# Patient Record
Sex: FEMALE | Race: BLACK OR AFRICAN AMERICAN | Employment: OTHER | URBAN - METROPOLITAN AREA
[De-identification: names, ages, dates, MRNs, and addresses within clinical notes are randomized per-mention and may not be internally consistent; named-entity substitution may affect disease eponyms.]

---

## 2017-08-21 ENCOUNTER — OFFICE VISIT (OUTPATIENT)
Dept: HEMATOLOGY | Age: 67
End: 2017-08-21

## 2017-08-21 ENCOUNTER — HOSPITAL ENCOUNTER (OUTPATIENT)
Dept: LAB | Age: 67
Discharge: HOME | End: 2017-08-21
Payer: COMMERCIAL

## 2017-08-21 VITALS
WEIGHT: 119 LBS | TEMPERATURE: 98 F | DIASTOLIC BLOOD PRESSURE: 86 MMHG | HEART RATE: 67 BPM | OXYGEN SATURATION: 100 % | BODY MASS INDEX: 23.36 KG/M2 | RESPIRATION RATE: 16 BRPM | SYSTOLIC BLOOD PRESSURE: 134 MMHG | HEIGHT: 60 IN

## 2017-08-21 DIAGNOSIS — R74.8 ELEVATED LIVER ENZYMES: Primary | ICD-10-CM

## 2017-08-21 DIAGNOSIS — R74.8 ELEVATED LIVER ENZYMES: ICD-10-CM

## 2017-08-21 PROBLEM — M85.80 OSTEOPENIA: Status: ACTIVE | Noted: 2017-08-21

## 2017-08-21 PROBLEM — D64.9 ANEMIA: Status: ACTIVE | Noted: 2017-08-21

## 2017-08-21 PROBLEM — M81.0 AGE-RELATED OSTEOPOROSIS WITHOUT CURRENT PATHOLOGICAL FRACTURE: Status: ACTIVE | Noted: 2017-08-21

## 2017-08-21 LAB
ALBUMIN SERPL-MCNC: 4.6 G/DL (ref 3.4–5)
ALBUMIN/GLOB SERPL: 1.3 {RATIO} (ref 0.8–1.7)
ALP SERPL-CCNC: 59 U/L (ref 45–117)
ALT SERPL-CCNC: 29 U/L (ref 13–56)
ANION GAP SERPL CALC-SCNC: 9 MMOL/L (ref 3–18)
AST SERPL-CCNC: 27 U/L (ref 15–37)
BASOPHILS # BLD: 0 K/UL (ref 0–0.06)
BASOPHILS NFR BLD: 0 % (ref 0–2)
BILIRUB DIRECT SERPL-MCNC: 0.1 MG/DL (ref 0–0.2)
BILIRUB SERPL-MCNC: 0.4 MG/DL (ref 0.2–1)
BUN SERPL-MCNC: 18 MG/DL (ref 7–18)
BUN/CREAT SERPL: 23 (ref 12–20)
CALCIUM SERPL-MCNC: 9.6 MG/DL (ref 8.5–10.1)
CHLORIDE SERPL-SCNC: 103 MMOL/L (ref 100–108)
CO2 SERPL-SCNC: 28 MMOL/L (ref 21–32)
CREAT SERPL-MCNC: 0.79 MG/DL (ref 0.6–1.3)
DIFFERENTIAL METHOD BLD: ABNORMAL
EOSINOPHIL # BLD: 0.1 K/UL (ref 0–0.4)
EOSINOPHIL NFR BLD: 1 % (ref 0–5)
ERYTHROCYTE [DISTWIDTH] IN BLOOD BY AUTOMATED COUNT: 15 % (ref 11.6–14.5)
FERRITIN SERPL-MCNC: 685 NG/ML (ref 8–388)
GLOBULIN SER CALC-MCNC: 3.5 G/DL (ref 2–4)
GLUCOSE SERPL-MCNC: 87 MG/DL (ref 74–99)
HCT VFR BLD AUTO: 33 % (ref 35–45)
HGB BLD-MCNC: 10.6 G/DL (ref 12–16)
IRON SATN MFR SERPL: 21 %
IRON SERPL-MCNC: 87 UG/DL (ref 50–175)
LYMPHOCYTES # BLD: 2.2 K/UL (ref 0.9–3.6)
LYMPHOCYTES NFR BLD: 50 % (ref 21–52)
MCH RBC QN AUTO: 23.1 PG (ref 24–34)
MCHC RBC AUTO-ENTMCNC: 32.1 G/DL (ref 31–37)
MCV RBC AUTO: 72.1 FL (ref 74–97)
MONOCYTES # BLD: 0.4 K/UL (ref 0.05–1.2)
MONOCYTES NFR BLD: 10 % (ref 3–10)
NEUTS SEG # BLD: 1.7 K/UL (ref 1.8–8)
NEUTS SEG NFR BLD: 39 % (ref 40–73)
PLATELET # BLD AUTO: 385 K/UL (ref 135–420)
PMV BLD AUTO: 9.6 FL (ref 9.2–11.8)
POTASSIUM SERPL-SCNC: 4.3 MMOL/L (ref 3.5–5.5)
PROT SERPL-MCNC: 8.1 G/DL (ref 6.4–8.2)
RBC # BLD AUTO: 4.58 M/UL (ref 4.2–5.3)
SODIUM SERPL-SCNC: 140 MMOL/L (ref 136–145)
TIBC SERPL-MCNC: 409 UG/DL (ref 250–450)
WBC # BLD AUTO: 4.3 K/UL (ref 4.6–13.2)

## 2017-08-21 PROCEDURE — 86038 ANTINUCLEAR ANTIBODIES: CPT | Performed by: NURSE PRACTITIONER

## 2017-08-21 PROCEDURE — 86706 HEP B SURFACE ANTIBODY: CPT | Performed by: NURSE PRACTITIONER

## 2017-08-21 PROCEDURE — 86803 HEPATITIS C AB TEST: CPT | Performed by: NURSE PRACTITIONER

## 2017-08-21 PROCEDURE — 83540 ASSAY OF IRON: CPT | Performed by: NURSE PRACTITIONER

## 2017-08-21 PROCEDURE — 86708 HEPATITIS A ANTIBODY: CPT | Performed by: NURSE PRACTITIONER

## 2017-08-21 PROCEDURE — 87340 HEPATITIS B SURFACE AG IA: CPT | Performed by: NURSE PRACTITIONER

## 2017-08-21 PROCEDURE — 80048 BASIC METABOLIC PNL TOTAL CA: CPT | Performed by: NURSE PRACTITIONER

## 2017-08-21 PROCEDURE — 83516 IMMUNOASSAY NONANTIBODY: CPT | Performed by: NURSE PRACTITIONER

## 2017-08-21 PROCEDURE — 86704 HEP B CORE ANTIBODY TOTAL: CPT | Performed by: NURSE PRACTITIONER

## 2017-08-21 PROCEDURE — 82728 ASSAY OF FERRITIN: CPT | Performed by: NURSE PRACTITIONER

## 2017-08-21 PROCEDURE — 36415 COLL VENOUS BLD VENIPUNCTURE: CPT | Performed by: NURSE PRACTITIONER

## 2017-08-21 PROCEDURE — 80076 HEPATIC FUNCTION PANEL: CPT | Performed by: NURSE PRACTITIONER

## 2017-08-21 PROCEDURE — 85025 COMPLETE CBC W/AUTO DIFF WBC: CPT | Performed by: NURSE PRACTITIONER

## 2017-08-21 RX ORDER — FENOFIBRATE 160 MG/1
160 TABLET ORAL DAILY
COMMUNITY

## 2017-08-21 RX ORDER — GLUCOSAMINE SULFATE 1500 MG
POWDER IN PACKET (EA) ORAL DAILY
COMMUNITY

## 2017-08-21 NOTE — PROGRESS NOTES
[address] Kelle Blackwell MD, [address], [facility], [place]       Fredia Gram, NP Brooke Goodell, PA-C Franciso Barker, NP Ronnell Fey, NP        at [address], [address] [address]  [address].     [phone]     FAX: [phone]    at [address], [address] [place] [address], [address]     [phone]     FAX: [phone]         Patient Care Team:  Fernando Galeano MD as PCP - General (Family Practice)  Rosanna Morrissey MD (Gastroenterology)  Chick MD Tito (Internal Medicine)      Problem List  Never Reviewed          Codes Class Noted    Osteopenia ICD-10-CM: M85.80  ICD-9-CM: 733.90  8/21/2017        Elevated liver enzymes ICD-10-CM: R74.8  ICD-9-CM: 790.5  8/21/2017        Age-related osteoporosis without current pathological fracture ICD-10-CM: M81.0  ICD-9-CM: 733.01  8/21/2017        Anemia ICD-10-CM: D64.9  ICD-9-CM: 285.9  8/21/2017              The physicians listed above have asked me to see Paul Schwarz in consultation regarding elevated liver enzymes and elevated ferritin and its management. No medical records were available for review when the patient was here for the appointment. The patient is a 74 y.o. Black female who was first noted to have abnormalities in liver enzymes and ferritin in 5/2017. Serologic evaluation for markers of chronic liver disease were positive for elevation in ferritin,    Imaging of the liver was not performed. An assessment of liver fibrosis with biopsy or elastography has not been performed. The patient had not started any new medications within 3 months preceeding the elevation in liver chemistries.     The most recent laboratory studies indicate that the AST is elevated, ALT is normal, ALP is normal, tests of hepatic synthetic and metabolic function are normal, total bilirubin is normal, albumin is normal, and the platelet count is normal.    The patient has no symptoms which could be attributed to the liver disorder. The patient completes all daily activities without any functional limitations. The patient has not experienced fatigue, pain in the right side over the liver. ALLERGIES  Allergies   Allergen Reactions    Codeine Other (comments)    Statins-Hmg-Coa Reductase Inhibitors Other (comments)    Sulfa (Sulfonamide Antibiotics) Rash       MEDICATIONS  Current Outpatient Prescriptions   Medication Sig    fenofibrate (LOFIBRA) 160 mg tablet Take 160 mg by mouth daily.  cholecalciferol (VITAMIN D3) 1,000 unit cap Take  by mouth daily.  multivitamin, tx-iron-ca-min (THERA-M W/ IRON) 9 mg iron-400 mcg tab tablet Take 1 Tab by mouth daily.  denosumab (PROLIA) 60 mg/mL injection 60 mg by SubCUTAneous route. No current facility-administered medications for this visit. SYSTEM REVIEW NOT RELATED TO LIVER DISEASE OR REVIEWED ABOVE:  Constitution systems: Negative for fever, chills, weight gain, weight loss. Eyes: Negative for visual changes. ENT: Negative for sore throat, painful swallowing. Respiratory: Negative for cough, hemoptysis, SOB. Cardiology: Negative for chest pain, palpitations. GI:  Negative for constipation or diarrhea. : Negative for urinary frequency, dysuria, hematuria, nocturia. Skin: Negative for rash. Hematology: Negative for easy bruising, blood clots. Musculo-skelatal: Negative for back pain, muscle pain, weakness. Neurologic: Negative for headaches, dizziness, vertigo, memory problems not related to HE. Psychology: Negative for anxiety, depression. FAMILY HISTORY:  The father  of unknown causes. The mother is alive and has the following chronic diseases: CAD, COPD, DM. There is no family history of liver disease. SOCIAL HISTORY:  The patient is . The patient has 2 children, 1 grandchild.     The patient has never used tobacco products. The patient consumes alcohol on social occasions never in excess. The patient used to work in a medical office. The patient retired in 2011. PHYSICAL EXAMINATION:  Visit Vitals    /86 (BP 1 Location: Right arm, BP Patient Position: Sitting)    Pulse 67    Temp 98 °F (36.7 °C) (Tympanic)    Resp 16    Ht 5' (1.524 m)    Wt 119 lb (54 kg)    SpO2 100%    BMI 23.24 kg/m2     General: No acute distress. Eyes: Sclera anicteric. ENT: No oral lesions. Thyroid normal.  Nodes: No adenopathy. Skin: No spider angiomata. No jaundice. No palmar erythema. Respiratory: Lungs clear to auscultation. Cardiovascular: Regular heart rate. No murmurs. No JVD. Abdomen: Soft non-tender. Liver size normal to percussion/palpation. Spleen not palpable. No obvious ascites. Extremities: No edema. No muscle wasting. No gross arthritic changes. Neurologic: Alert and oriented. Cranial nerves grossly intact. No asterixis. LABORATORY STUDIES:  From 5/2017  AST/ALT/ALP/T Bili/ALB: 46/22/52/0.2/4.6  WBC/HB/PLT/INR: 4.9/10.2/329/  BUN/CREAT: 16/0.79    SEROLOGIES:  Not available or performed. Testing was performed today. LIVER HISTOLOGY:  Not available or performed    ENDOSCOPIC PROCEDURES:  Not available or performed    RADIOLOGY:  Not available or performed    OTHER TESTING:  Not available or performed    ASSESSMENT AND PLAN:  Elevation in AST and Ferritin of unclear etiology at this time. Liver function is normal.  Total bilirubin is normal. Serum albumin is normal.  The platelet count is normal.      Serologic testing to help define the cause of the laboratory abnormality were ordered. Will perform additional serologic tests to screen for other causes of chronic liver disease. Will perform laboratory testing to monitor liver function and degree of liver injury. This included BMP, hepatic panel, CBC with platelet count.     Will perform imaging of the liver with ultrasound. The need to assess liver fibrosis was discussed. Sheer wave elastography can assess liver fibrosis and determine if a patient has advanced fibrosis or cirrhosis without the need for liver biopsy. Sheer wave elastography is now available at [facility]. This will be scheduled. If elastrography suggests advanced fibrosis then a liver biopsy should be considered. The patient was directed to continue all current medications at the current dosages. There are no contraindications for the patient to take any medications that are necessary for treatment of other medical issues. The patient was counseled regarding alcohol consumption. The need for vaccination against viral hepatitis A and B will be assessed with serologic and instituted as appropriate. All of the above issues were discussed with the patient. All questions were answered. The patient expressed a clear understanding of the above. [address] [place] [address] in 4 weeks to review all data and determine the treatment plan.     Lane Luna MD  Liver [place] of [address] [place] [address], [address] [place] [address] - [address]  [phone]

## 2017-08-21 NOTE — PROGRESS NOTES
[address] Kelle Blackwell MD, [place], [facility], [place]       Huber Jones, NARINDER Mcelroy, CELENA Vital, NARINDER Zamudio NP        at [address], [address] [address]  [address].     [phone]     FAX: [phone]    at [address], [address] [place] [address], [address]     [phone]     FAX: [phone]         Patient Care Team:  David Grant MD as PCP - General (Family Practice)  Wilson Atwood MD (Gastroenterology)  Javier Maharaj MD (Internal Medicine)      Problem List  Never Reviewed          Codes Class Noted    Osteopenia ICD-10-CM: M85.80  ICD-9-CM: 733.90  8/21/2017        Elevated liver enzymes ICD-10-CM: R74.8  ICD-9-CM: 790.5  8/21/2017                The physicians listed above have asked me to see Nilda Rahman in consultation regarding elevated liver enzymes and elevated ferritin level and its management. No medical records were available for review when the patient was here for the appointment. The patient is a 74 y.o. Black female who was first noted to have abnormalities in liver enzymes in 5/2017     Serologic evaluation for markers of chronic liver disease were either not performed or available to me. iron studies,  elevation in ferritin,  elevation in iron saturation,   a low alpha-1-antitrypsin level  a low ceruloplasmin. Imaging of the liver was either not performed or the results are not available to me. The most recent laboratory studies indicate that the AST is slightly elevated. ALT is normal. ALP is normal, tests of hepatic synthetic and metabolic function are normal,   depressed,   total bilirubin is normal, albumin is normal, platelet count is normal.    The patient has no symptoms which could be attributed to the liver disorder.       The patient completes all daily activities without any functional limitations. The patient has not experienced pain in the right side over the liver, nausea, vomiting,       ALLERGIES  Allergies   Allergen Reactions    Codeine Other (comments)    Statins-Hmg-Coa Reductase Inhibitors Other (comments)    Sulfa (Sulfonamide Antibiotics) Rash       MEDICATIONS  Current Outpatient Prescriptions   Medication Sig    fenofibrate (LOFIBRA) 160 mg tablet Take 160 mg by mouth daily.  cholecalciferol (VITAMIN D3) 1,000 unit cap Take  by mouth daily.  multivitamin, tx-iron-ca-min (THERA-M W/ IRON) 9 mg iron-400 mcg tab tablet Take 1 Tab by mouth daily. No current facility-administered medications for this visit. SYSTEM REVIEW NOT RELATED TO LIVER DISEASE OR REVIEWED ABOVE:  Constitution systems: Negative for fever, chills, weight gain, weight loss. Eyes: Negative for visual changes. ENT: Negative for sore throat, painful swallowing. Respiratory: Negative for cough, hemoptysis, SOB. Cardiology: Negative for chest pain, palpitations. GI:  Negative for constipation or diarrhea. : Negative for urinary frequency, dysuria, hematuria, nocturia. Skin: Negative for rash. Hematology: Negative for easy bruising, blood clots. Musculo-skelatal: Negative for back pain, muscle pain, weakness. Neurologic: Negative for headaches, dizziness, vertigo, memory problems not related to HE. Psychology: Negative for anxiety, depression. FAMILY HISTORY:  The father  of unknown cause      The mother is alive and has the following chronic diseases: CHF, COPD, DM, Obesity      There is no family history of liver disease. SOCIAL HISTORY:  The patient is . The patient has 2 children, 1 grandchild. The patient has never used tobacco products. The patient has never consumed significant amounts of alcohol. Consumes 2 glasses of wine/week. The patient retired in  from a medical office.      PHYSICAL EXAMINATION:  Visit Vitals    BP 134/86 (BP 1 Location: Right arm, BP Patient Position: Sitting)    Pulse 67    Temp 98 °F (36.7 °C) (Tympanic)    Resp 16    Ht 5' (1.524 m)    Wt 119 lb (54 kg)    SpO2 100%    BMI 23.24 kg/m2     General: No acute distress. Eyes: Sclera anicteric. ENT: No oral lesions. Thyroid normal.  Nodes: No adenopathy. Skin: No spider angiomata. No jaundice. No palmar erythema. Respiratory: Lungs clear to auscultation. Cardiovascular: Regular heart rate. No murmurs. No JVD. Abdomen: Soft non-tender. Liver size normal to percussion/palpation. Spleen not palpable. No obvious ascites. Extremities: No edema. No muscle wasting. No gross arthritic changes. Neurologic: Alert and oriented. Cranial nerves grossly intact. No asterixis. LABORATORY STUDIES:  From   AST/ALT/ALP/T Bili/ALB:   WBC/HB/PLT/INR:  BUN/CREAT:    Recent liver function panel, CBC with platelet count and BMP are not available. These studies will be performed. SEROLOGIES:  Not available or performed. Testing was performed today. LIVER HISTOLOGY:  Not available or performed    ENDOSCOPIC PROCEDURES:  Not available or performed    RADIOLOGY:  Not available or performed    OTHER TESTING:  Not available or performed    ASSESSMENT AND PLAN:  Persistent elevation in  Intermitant elevation in  liver transaminases   and   alkaline phosphate   of unclear etiology at this time. Liver function is   normal.    depressed. Total bilirubin is   normal.    elevated. Serum albumin is   normal.    depressed. INR is   normal.    prolonged. The platelet count is   normal.    depressed. Based upon laboratory studies   and imaging   the patient   does not appear to have   appears to have   may have   advanced liver disease or  cirrhosis. Serologic testing to help define the cause of the laboratory abnormality   were ordered. were all negative.     were positive for   HCV   HBV   DORY   ASMA   AMA Alpha-1-antitrypsin  Khurram disease   an elevation in iron studies   Will perform additional serologic tests to screen for other causes of chronic liver disease. The most likely causes for the liver chemistry abnormalities were discussed with the patient and include   viral hepatitis,   fatty liver disease,   immune liver disorders,   genetic metabolic liver disorders,   medications,   a non-specific non-hepatitis virus. Will perform laboratory testing to monitor liver function and degree of liver injury. This included   BMP,   hepatic panel,   CBC with platelet count,   INR. Will perform imaging of the liver with   ultrasound. triple phase CT scan. dynamic MRI. The need to perform a liver biopsy to help determine the cause and severity of the liver test abnormalities was discussed. The risks of performing the liver biopsy including pain, puncture of the lung, gallbladder, intestine or kidney and bleeding were discussed. The patient has decided to have a liver biopsy. This will be scheduled. Will defer liver biopsy for now. There is no reason to perform liver biopsy at this time. Liver chemistries will be monitored. The patient had a liver biopsy performed in ***. We will request that the liver biopsy slides sent be to me for my personal review. The patient was directed to continue all current medications at the current dosages. There are no contraindications for the patient to take any medications that are necessary for treatment of other medical issues. The patient was instructed to stop taking ***. The patient was counseled regarding alcohol consumption. Vaccination for viral hepatitis A and B is recommended since the patient has no serologic evidence of previous exposure or vaccination with immunity. Vaccination for viral hepatitis A and B is not recommended. The patient has serologic evidence of prior exposure or vaccination with immunity.   Vaccination for viral hepatitis A and B has been initiated. Vaccination for viral hepatitis A and B has been completed. Serologic studies will be performed to assess response to vaccine. The need for vaccination against viral hepatitis A and B will be assessed with serologic and instituted as appropriate. [address] screening   has recently been performed and does not suggest [address] The next liver imaging study will be performed in ***/***.  will be performed. AFP was ordered today. Ultrasound will be scheduled prior to or the same day as the next office visit. demonstrates   an elevation in AFP. a lesion on ultrasound. Will perform   triple phase CT scan   MRI   of liver to further characterize the lesion and help determine if this represents [address] All of the above issues were discussed with the patient. All questions were answered. The patient expressed a clear understanding of the above. Follow-up [address] in ***  weeks. months  2 weeks after liver biopsy. to review all data and determine the treatment plan.

## 2017-08-21 NOTE — MR AVS SNAPSHOT
Visit Information Date & Time Provider Department Dept. Phone Encounter #  
 8/21/2017 11:30 AM [facility]  [address] [id number] Follow-up Instructions Return in about 4 weeks (around 9/18/2017) for Erasmovalentino. Upcoming Health Maintenance Date Due Hepatitis C Screening 1950 DTaP/Tdap/Td series (1 - Tdap) 2/16/1973 BREAST CANCER SCRN MAMMOGRAM 2/16/2002 FOBT Q 1 YEAR AGE 50-75 2/16/2002 ZOSTER VACCINE AGE 60> 12/16/2011 GLAUCOMA SCREENING Q2Y 2/16/2017 OSTEOPOROSIS SCREENING (DEXA) 2/16/2017 Pneumococcal 65+ Low/Medium Risk (1 of 2 - PCV13) 2/16/2017 MEDICARE YEARLY EXAM 2/16/2017 INFLUENZA AGE 9 TO ADULT 8/1/2017 Allergies as of 8/21/2017  Review Complete On: 8/21/2017 By: Bong Fuentes Severity Noted Reaction Type Reactions Codeine  08/21/2017    Other (comments) Statins-hmg-coa Reductase Inhibitors  08/21/2017    Other (comments) Sulfa (Sulfonamide Antibiotics)  08/21/2017    Rash Current Immunizations  Never Reviewed No immunizations on file. Not reviewed this visit You Were Diagnosed With   
  
 Codes Comments Elevated liver enzymes    -  Primary ICD-10-CM: R74.8 ICD-9-CM: 790.5 Vitals BP Pulse Temp Resp Height(growth percentile) Weight(growth percentile) 134/86 (BP 1 Location: Right arm, BP Patient Position: Sitting) 67 98 °F (36.7 °C) (Tympanic) 16 5' (1.524 m) 119 lb (54 kg) SpO2 BMI OB Status Smoking Status 100% 23.24 kg/m2 Postmenopausal Never Smoker Vitals History BMI and BSA Data Body Mass Index Body Surface Area  
 23.24 kg/m 2 1.51 m 2 Your Updated Medication List  
  
   
This list is accurate as of: 8/21/17  1:23 PM.  Always use your most recent med list.  
  
  
  
  
 fenofibrate 160 mg tablet Commonly known as:  LOFIBRA Take 160 mg by mouth daily. multivitamin, tx-iron-ca-min 9 mg iron-400 mcg Tab tablet Commonly known as:  THERA-M w/ IRON Take 1 Tab by mouth daily. VITAMIN D3 1,000 unit Cap Generic drug:  cholecalciferol Take  by mouth daily. Follow-up Instructions Return in about 4 weeks (around 9/18/2017) for Eleazar. To-Do List   
 08/21/2017 Lab:  ACTIN (SMOOTH MUSCLE) ANTIBODY   
  
 08/21/2017 Lab:  ANTINUCLEAR ANTIBODIES, IFA   
  
 08/21/2017 Lab:  CBC WITH AUTOMATED DIFF   
  
 08/21/2017 Lab:  FERRITIN   
  
 08/21/2017 Lab:  HCV AB W/REFLEX VERIFICATION   
  
 08/21/2017 Lab:  HEP A AB, TOTAL   
  
 08/21/2017 Lab:  HEP B SURFACE AB   
  
 08/21/2017 Lab:  HEP B SURFACE AG   
  
 08/21/2017 Lab:  HEPATIC FUNCTION PANEL   
  
 08/21/2017 Lab:  HEPATITIS B CORE AB, TOTAL   
  
 08/21/2017 Lab:  IRON PROFILE   
  
 08/21/2017 Lab:  METABOLIC PANEL, BASIC   
  
 08/21/2017 Imaging:  US ABD LTD W ELASTOGRAPHY Introducing hospitals & HEALTH SERVICES! [address] [place] [address] introduces [employer] patient portal. Now you can access parts of your medical record, email your doctor's office, and request medication refills online. 1. In your internet browser, go to [email or web address] 2. Click on the First Time User? Click Here link in the Sign In box. You will see the New Member Sign Up page. 3. Enter your [employer] Access Code exactly as it appears below. You will not need to use this code after youve completed the sign-up process. If you do not sign up before the expiration date, you must request a new code. · [employer] Access Code: [email or web address] Expires: 11/19/2017  1:23 PM 
 
4. Enter the last four digits of your Social Security Number (xxxx) and Date of Birth (mm/dd/yyyy) as indicated and click Submit. You will be taken to the next sign-up page. 5. Create a [employer] ID.  This will be your [employer] login ID and cannot be changed, so think of one that is secure and easy to remember. 6. Create a [employer] password. You can change your password at any time. 7. Enter your Password Reset Question and Answer. This can be used at a later time if you forget your password. 8. Enter your e-mail address. You will receive e-mail notification when new information is available in [address] 9. Click Sign Up. You can now view and download portions of your medical record. 10. Click the Download Summary menu link to download a portable copy of your medical information. If you have questions, please visit the Frequently Asked Questions section of the [employer] website. Remember, [employer] is NOT to be used for urgent needs. For medical emergencies, dial 911. Now available from your iPhone and Android! Please provide this summary of care documentation to your next provider. Your primary care clinician is listed as Issac Swartz. If you have any questions after today's visit, please call [phone].

## 2017-08-22 LAB
ACTIN IGG SERPL-ACNC: 11 UNITS (ref 0–19)
ANA TITR SER IF: POSITIVE {TITER}
COMMENT, 144067: NORMAL
HAV AB SER QL IA: NEGATIVE
HBV CORE AB SERPL QL IA: NEGATIVE
HBV SURFACE AB SER QL IA: NEGATIVE
HBV SURFACE AB SERPL IA-ACNC: <3.1 MIU/ML
HBV SURFACE AG SER QL: <0.1 INDEX
HBV SURFACE AG SER QL: NEGATIVE
HCV AB S/CO SERPL IA: 0.1 S/CO RATIO (ref 0–0.9)
HEP BS AB COMMENT,HBSAC: ABNORMAL
HOMOGENEOUS PATTERN, 016279: NORMAL
NOTE:, 016270: NORMAL

## 2017-09-05 ENCOUNTER — HOSPITAL ENCOUNTER (OUTPATIENT)
Dept: ULTRASOUND IMAGING | Age: 67
Discharge: HOME | End: 2017-09-05
Attending: NURSE PRACTITIONER
Payer: MEDICARE

## 2017-09-05 DIAGNOSIS — R74.8 ELEVATED LIVER ENZYMES: ICD-10-CM

## 2017-09-05 PROCEDURE — 0346T US ABD LTD W ELASTOGRAPHY: CPT

## 2017-09-18 ENCOUNTER — HOSPITAL ENCOUNTER (OUTPATIENT)
Dept: LAB | Age: 67
Discharge: HOME | End: 2017-09-18
Payer: MEDICARE

## 2017-09-18 ENCOUNTER — OFFICE VISIT (OUTPATIENT)
Dept: HEMATOLOGY | Age: 67
End: 2017-09-18

## 2017-09-18 VITALS
HEIGHT: 60 IN | HEART RATE: 74 BPM | BODY MASS INDEX: 23.44 KG/M2 | WEIGHT: 119.4 LBS | SYSTOLIC BLOOD PRESSURE: 128 MMHG | OXYGEN SATURATION: 99 % | TEMPERATURE: 96.8 F | RESPIRATION RATE: 16 BRPM | DIASTOLIC BLOOD PRESSURE: 88 MMHG

## 2017-09-18 DIAGNOSIS — R74.8 ELEVATED LIVER ENZYMES: Primary | ICD-10-CM

## 2017-09-18 DIAGNOSIS — R74.8 ELEVATED LIVER ENZYMES: ICD-10-CM

## 2017-09-18 LAB
ALBUMIN SERPL-MCNC: 4.3 G/DL (ref 3.4–5)
ALBUMIN/GLOB SERPL: 1.3 {RATIO} (ref 0.8–1.7)
ALP SERPL-CCNC: 60 U/L (ref 45–117)
ALT SERPL-CCNC: 28 U/L (ref 13–56)
AST SERPL-CCNC: 30 U/L (ref 15–37)
BILIRUB DIRECT SERPL-MCNC: 0.1 MG/DL (ref 0–0.2)
BILIRUB SERPL-MCNC: 0.2 MG/DL (ref 0.2–1)
GLOBULIN SER CALC-MCNC: 3.2 G/DL (ref 2–4)
PROT SERPL-MCNC: 7.5 G/DL (ref 6.4–8.2)

## 2017-09-18 PROCEDURE — 36415 COLL VENOUS BLD VENIPUNCTURE: CPT | Performed by: NURSE PRACTITIONER

## 2017-09-18 PROCEDURE — 80076 HEPATIC FUNCTION PANEL: CPT | Performed by: NURSE PRACTITIONER

## 2017-09-18 PROCEDURE — 81256 HFE GENE: CPT | Performed by: NURSE PRACTITIONER

## 2017-09-18 NOTE — PROGRESS NOTES
March Leigha is a 74 y.o. female    No chief complaint on file. 1. Have you been to the ER, urgent care clinic or hospitalized since your last visit? NO.     2. Have you seen or consulted any other health care providers outside of the [address] [place] [address] since your last visit (Include any pap smears or colon screening)?  NO  Learning Assessment 8/21/2017   PRIMARY LEARNER Patient   PRIMARY LANGUAGE ENGLISH   LEARNER PREFERENCE PRIMARY DEMONSTRATION   ANSWERED BY self   RELATIONSHIP SELF

## 2017-09-18 NOTE — MR AVS SNAPSHOT
Visit Information Date & Time Provider Department Dept. Phone Encounter #  
 9/18/2017  8:05 AM MD Ellie Melchor [address] of  [address] [id number] Follow-up Instructions Return in about 6 months (around 3/18/2018) for chandan. Upcoming Health Maintenance Date Due DTaP/Tdap/Td series (1 - Tdap) 2/16/1973 BREAST CANCER SCRN MAMMOGRAM 2/16/2002 FOBT Q 1 YEAR AGE 50-75 2/16/2002 ZOSTER VACCINE AGE 60> 12/16/2011 GLAUCOMA SCREENING Q2Y 2/16/2017 Pneumococcal 65+ Low/Medium Risk (1 of 2 - PCV13) 2/16/2017 MEDICARE YEARLY EXAM 2/16/2017 INFLUENZA AGE 9 TO ADULT 8/1/2017 Allergies as of 9/18/2017  Review Complete On: 9/18/2017 By: Coby De Anda Severity Noted Reaction Type Reactions Codeine  08/21/2017    Other (comments) Statins-hmg-coa Reductase Inhibitors  08/21/2017    Other (comments) Sulfa (Sulfonamide Antibiotics)  08/21/2017    Rash Current Immunizations  Never Reviewed No immunizations on file. Not reviewed this visit You Were Diagnosed With   
  
 Codes Comments Elevated liver enzymes    -  Primary ICD-10-CM: R74.8 ICD-9-CM: 790.5 Vitals BP Pulse Temp Resp Height(growth percentile) Weight(growth percentile) 128/88 (BP 1 Location: Right arm, BP Patient Position: Sitting) 74 96.8 °F (36 °C) (Tympanic) 16 5' (1.524 m) 119 lb 6.4 oz (54.2 kg) SpO2 BMI OB Status Smoking Status 99% 23.32 kg/m2 Postmenopausal Never Smoker BMI and BSA Data Body Mass Index Body Surface Area  
 23.32 kg/m 2 1.51 m 2 Your Updated Medication List  
  
   
This list is accurate as of: 9/18/17  8:49 AM.  Always use your most recent med list.  
  
  
  
  
 fenofibrate 160 mg tablet Commonly known as:  LOFIBRA Take 160 mg by mouth daily. multivitamin, tx-iron-ca-min 9 mg iron-400 mcg Tab tablet Commonly known as:  THERA-M w/ IRON  
 Take 1 Tab by mouth daily. PROLIA 60 mg/mL injection Generic drug:  denosumab 60 mg by SubCUTAneous route. VITAMIN D3 1,000 unit Cap Generic drug:  cholecalciferol Take  by mouth daily. Follow-up Instructions Return in about 6 months (around 3/18/2018) for chandan. To-Do List   
 09/18/2017 Lab:  HEMOCHROMATOSIS MUTATION   
  
 03/18/2018 Lab:  HEPATIC FUNCTION PANEL Introducing [facility] SERVICES! Dear Alison Lee: Thank you for requesting a [employer] account. Our records indicate that you already have an active [employer] account. You can access your account anytime at [email or web address] Did you know that you can access your hospital and ER discharge instructions at any time in [employer]? You can also review all of your test results from your hospital stay or ER visit. Additional Information If you have questions, please visit the Frequently Asked Questions section of the [employer] website at [email or web address] Remember, [employer] is NOT to be used for urgent needs. For medical emergencies, dial 911. Now available from your iPhone and Android! Please provide this summary of care documentation to your next provider. Your primary care clinician is listed as [facility]. If you have any questions after today's visit, please call [phone].

## 2017-09-18 NOTE — PROGRESS NOTES
[address] Kelle Blackwell MD, [address], [facility], [place]       Binh Martínez, CELENA Carreon NP Harrell Duos, NP        at [address], [address] [address]  [address].     [phone]     FAX: [phone]    at [facility], [address] [place] [address], [address]     [phone]     FAX: [phone]         Patient Care Team:  Michaeleen Essex, MD as PCP - General (Family Practice)  Danika Simmons MD (Gastroenterology)  Flori Wolfe MD (Internal Medicine)      Problem List  Date Reviewed: 8/21/2017          Codes Class Noted    Osteopenia ICD-10-CM: M85.80  ICD-9-CM: 733.90  8/21/2017        Elevated liver enzymes ICD-10-CM: R74.8  ICD-9-CM: 790.5  8/21/2017        Age-related osteoporosis without current pathological fracture ICD-10-CM: M81.0  ICD-9-CM: 733.01  8/21/2017        Anemia ICD-10-CM: D64.9  ICD-9-CM: 285.9  8/21/2017              Deyvi Brower returns to the [address] for management of elevated liver enzymes and elevated ferritin level. The active problem list, all pertinent past medical history, medications, radiologic findings and laboratory findings related to the liver disorder were reviewed with the patient. The patient is a 74 y.o. Black female who was first noted to have abnormalities in liver enzymes and ferritin in 5/2017. Serologic evaluation for markers of chronic liver disease were positive for elevation in ferritin,    The most recent imaging of the liver was Ultrasound performed in 9/2017. Results suggest chronic liver disease. Assessment of liver fibrosis was performed with sheer wave elastogrphy at THE [facility] in 9/2017. The result was 3.7 kPa which correlates with no fibrosis.     The most recent laboratory studies indicate that the liver transaminases are now normal, alkaline phosphatase is normal, tests of hepatic synthetic and metabolic function are normal, and the platelet count is normal.      The patient has no symptoms which could be attributed to the liver disorder. The patient completes all daily activities without any functional limitations. The patient has not experienced fatigue, pain in the right side over the liver. ALLERGIES  Allergies   Allergen Reactions    Codeine Other (comments)    Statins-Hmg-Coa Reductase Inhibitors Other (comments)    Sulfa (Sulfonamide Antibiotics) Rash       MEDICATIONS  Current Outpatient Prescriptions   Medication Sig    fenofibrate (LOFIBRA) 160 mg tablet Take 160 mg by mouth daily.  cholecalciferol (VITAMIN D3) 1,000 unit cap Take  by mouth daily.  multivitamin, tx-iron-ca-min (THERA-M W/ IRON) 9 mg iron-400 mcg tab tablet Take 1 Tab by mouth daily.  denosumab (PROLIA) 60 mg/mL injection 60 mg by SubCUTAneous route. No current facility-administered medications for this visit. SYSTEM REVIEW NOT RELATED TO LIVER DISEASE OR REVIEWED ABOVE:  Constitution systems: Negative for fever, chills, weight gain, weight loss. Eyes: Negative for visual changes. ENT: Negative for sore throat, painful swallowing. Respiratory: Negative for cough, hemoptysis, SOB. Cardiology: Negative for chest pain, palpitations. GI:  Negative for constipation or diarrhea. : Negative for urinary frequency, dysuria, hematuria, nocturia. Skin: Negative for rash. Hematology: Negative for easy bruising, blood clots. Musculo-skelatal: Negative for back pain, muscle pain, weakness. Neurologic: Negative for headaches, dizziness, vertigo, memory problems not related to HE. Psychology: Negative for anxiety, depression. FAMILY HISTORY:  The father  of unknown causes. The mother is alive and has the following chronic diseases: CAD, COPD, DM. There is no family history of liver disease.         SOCIAL HISTORY:  The patient is . The patient has 2 children, 1 grandchild. The patient has never used tobacco products. The patient consumes alcohol on social occasions never in excess. The patient used to work in a medical office. The patient retired in 2011. PHYSICAL EXAMINATION:  Visit Vitals    /88 (BP 1 Location: Right arm, BP Patient Position: Sitting)    Pulse 74    Temp 96.8 °F (36 °C) (Tympanic)    Resp 16    Ht 5' (1.524 m)    Wt 119 lb 6.4 oz (54.2 kg)    SpO2 99%    BMI 23.32 kg/m2     General: No acute distress. Eyes: Sclera anicteric. ENT: No oral lesions. Thyroid normal.  Nodes: No adenopathy. Skin: No spider angiomata. No jaundice. No palmar erythema. Respiratory: Lungs clear to auscultation. Cardiovascular: Regular heart rate. No murmurs. No JVD. Abdomen: Soft non-tender. Liver size normal to percussion/palpation. Spleen not palpable. No obvious ascites. Extremities: No edema. No muscle wasting. No gross arthritic changes. Neurologic: Alert and oriented. Cranial nerves grossly intact. No asterixis.     LABORATORY STUDIES:  From 5/2017  AST/ALT/ALP/T Bili/ALB: 46/22/52/0.2/4.6  WBC/HB/PLT/INR: 4.9/10.2/329/  BUN/CREAT: 16/0.79    Liver [facility] Latest Ref Rng & Units 9/18/2017 8/21/2017   WBC 4.6 - 13.2 K/uL  4.3 (L)   ANC 1.8 - 8.0 K/UL  1.7 (L)   HGB 12.0 - 16.0 g/dL  10.6 (L)    - 420 K/uL  385   AST 15 - 37 U/L 30 27   ALT 13 - 56 U/L 28 29   Alk Phos 45 - 117 U/L 60 59   Bili, Total 0.2 - 1.0 MG/DL 0.2 0.4   Bili, Direct 0.0 - 0.2 MG/DL 0.1 0.1   Albumin 3.4 - 5.0 g/dL 4.3 4.6   BUN 7.0 - 18 MG/DL  18   Creat 0.6 - 1.3 MG/DL  0.79   Na 136 - 145 mmol/L  140   K 3.5 - 5.5 mmol/L  4.3   Cl 100 - 108 mmol/L  103   CO2 21 - 32 mmol/L  28   Glucose 74 - 99 mg/dL  87       SEROLOGIES:  Serologies Latest Ref Rng & Units 8/21/2017   Hep A Ab, Total NEGATIVE   NEGATIVE   Hep B Surface Ag <1.00 Index <0.10   Hep B Surface Ag Interp NEG   NEGATIVE   Hep B Core Ab, Total NEGATIVE   NEGATIVE   Hep B Surface Ab >10.0 mIU/mL <3.10 (L)   Hep B Surface Ab Interp POS   NEGATIVE (A)   Hep C Ab 0.0 - 0.9 s/co ratio 0.1   Ferritin 8 - 388 NG/ (H)   Iron % Saturation % 21   DORY, IFA  Positive (A)   DORY Pattern  1:80   ASMCA 0 - 19 Units 11       LIVER HISTOLOGY:  9/2017. Sheer wave elastography performed at [facility]. E Range: 3.03-4.86 kPa, E Mean: 3.83 kPa, E Median: 3.71 kPa, E Std: 0.67 kPa. The results suggested a fibrosis level of F0. ENDOSCOPIC PROCEDURES:  Not available or performed    RADIOLOGY:  9/2017. Ultrasound of liver. Mild heterogeneous echotexture. No liver mass lesions. OTHER TESTING:  Not available or performed    ASSESSMENT AND PLAN:  Transient elevation in AST which is now in the normal range. Have repeated liver enzymes again today. The liver enzymes and function remain normal.    There is an elevation in ferritin with normal iron saturation. It is unlikely that the patient has hemochromatosis or is a carrier for an HFE gene. Will order HFE genetic testing to be sure. The elevation in ferritin may reflect mild steatosis or inflammation and correlates with the increased echogenicity seen on the ultrasound. The positive DORY suggests that there the may be an underlying autoimmune liver disease. However, given that the liver enzymes have returned to normal this is unlikely. Will need to monitor the liver chemistries to see if the liver enzymes continue to fluctuate or become persistently elevated. .    The elastography is a normal value and suggests there is no liver injury. Elastography can be repeated annually to assess for fibrosis progression and need for treatment of the liver disorder. The patient was directed to continue all current medications at the current dosages. There are no contraindications for the patient to take any medications that are necessary for treatment of other medical issues.     The patient was counseled regarding alcohol consumption. Vaccination for viral hepatitis A and B is recommended since the patient has no serologic evidence of previous exposure or vaccination with immunity. All of the above issues were discussed with the patient. All questions were answered. The patient expressed a clear understanding of the above. [address] [place] [address] in 6 months to review all data and determine the treatment plan.     Freddy Esteves MD  Liver [place] of [address] [place] [address], [address] [place] [address], [address] [place] [address] Street [address] [phone]

## 2017-09-21 LAB — HFE GENE MUT ANL BLD/T: NORMAL

## 2018-03-20 ENCOUNTER — OFFICE VISIT (OUTPATIENT)
Dept: HEMATOLOGY | Age: 68
End: 2018-03-20

## 2018-03-20 ENCOUNTER — HOSPITAL ENCOUNTER (OUTPATIENT)
Dept: LAB | Age: 68
Discharge: HOME | End: 2018-03-20
Payer: MEDICARE

## 2018-03-20 VITALS
OXYGEN SATURATION: 97 % | SYSTOLIC BLOOD PRESSURE: 116 MMHG | HEART RATE: 77 BPM | WEIGHT: 122 LBS | TEMPERATURE: 98.5 F | DIASTOLIC BLOOD PRESSURE: 73 MMHG | BODY MASS INDEX: 23.95 KG/M2 | RESPIRATION RATE: 18 BRPM | HEIGHT: 60 IN

## 2018-03-20 DIAGNOSIS — R74.8 ELEVATED LIVER ENZYMES: Primary | ICD-10-CM

## 2018-03-20 DIAGNOSIS — R74.8 ELEVATED LIVER ENZYMES: ICD-10-CM

## 2018-03-20 LAB
ALBUMIN SERPL-MCNC: 4.1 G/DL (ref 3.4–5)
ALBUMIN/GLOB SERPL: 1.2 {RATIO} (ref 0.8–1.7)
ALP SERPL-CCNC: 35 U/L (ref 45–117)
ALT SERPL-CCNC: 25 U/L (ref 13–56)
ANION GAP SERPL CALC-SCNC: 11 MMOL/L (ref 3–18)
AST SERPL-CCNC: 25 U/L (ref 15–37)
BASOPHILS # BLD: 0 K/UL (ref 0–0.06)
BASOPHILS NFR BLD: 0 % (ref 0–2)
BILIRUB DIRECT SERPL-MCNC: 0.1 MG/DL (ref 0–0.2)
BILIRUB SERPL-MCNC: 0.3 MG/DL (ref 0.2–1)
BUN SERPL-MCNC: 17 MG/DL (ref 7–18)
BUN/CREAT SERPL: 25 (ref 12–20)
CALCIUM SERPL-MCNC: 9.2 MG/DL (ref 8.5–10.1)
CHLORIDE SERPL-SCNC: 108 MMOL/L (ref 100–108)
CO2 SERPL-SCNC: 27 MMOL/L (ref 21–32)
CREAT SERPL-MCNC: 0.68 MG/DL (ref 0.6–1.3)
DIFFERENTIAL METHOD BLD: ABNORMAL
EOSINOPHIL # BLD: 0.1 K/UL (ref 0–0.4)
EOSINOPHIL NFR BLD: 3 % (ref 0–5)
ERYTHROCYTE [DISTWIDTH] IN BLOOD BY AUTOMATED COUNT: 14.7 % (ref 11.6–14.5)
FERRITIN SERPL-MCNC: 528 NG/ML (ref 8–388)
GLOBULIN SER CALC-MCNC: 3.4 G/DL (ref 2–4)
GLUCOSE SERPL-MCNC: 84 MG/DL (ref 74–99)
HCT VFR BLD AUTO: 31.3 % (ref 35–45)
HGB BLD-MCNC: 10 G/DL (ref 12–16)
IRON SATN MFR SERPL: 20 %
IRON SERPL-MCNC: 77 UG/DL (ref 50–175)
LYMPHOCYTES # BLD: 2.1 K/UL (ref 0.9–3.6)
LYMPHOCYTES NFR BLD: 52 % (ref 21–52)
MCH RBC QN AUTO: 23.4 PG (ref 24–34)
MCHC RBC AUTO-ENTMCNC: 31.9 G/DL (ref 31–37)
MCV RBC AUTO: 73.1 FL (ref 74–97)
MONOCYTES # BLD: 0.4 K/UL (ref 0.05–1.2)
MONOCYTES NFR BLD: 10 % (ref 3–10)
NEUTS SEG # BLD: 1.5 K/UL (ref 1.8–8)
NEUTS SEG NFR BLD: 35 % (ref 40–73)
PLATELET # BLD AUTO: 338 K/UL (ref 135–420)
PMV BLD AUTO: 10.1 FL (ref 9.2–11.8)
POTASSIUM SERPL-SCNC: 4.1 MMOL/L (ref 3.5–5.5)
PROT SERPL-MCNC: 7.5 G/DL (ref 6.4–8.2)
RBC # BLD AUTO: 4.28 M/UL (ref 4.2–5.3)
SODIUM SERPL-SCNC: 146 MMOL/L (ref 136–145)
TIBC SERPL-MCNC: 376 UG/DL (ref 250–450)
WBC # BLD AUTO: 4.1 K/UL (ref 4.6–13.2)

## 2018-03-20 PROCEDURE — 82728 ASSAY OF FERRITIN: CPT | Performed by: NURSE PRACTITIONER

## 2018-03-20 PROCEDURE — 80048 BASIC METABOLIC PNL TOTAL CA: CPT | Performed by: NURSE PRACTITIONER

## 2018-03-20 PROCEDURE — 36415 COLL VENOUS BLD VENIPUNCTURE: CPT | Performed by: NURSE PRACTITIONER

## 2018-03-20 PROCEDURE — 83540 ASSAY OF IRON: CPT | Performed by: NURSE PRACTITIONER

## 2018-03-20 PROCEDURE — 85025 COMPLETE CBC W/AUTO DIFF WBC: CPT | Performed by: NURSE PRACTITIONER

## 2018-03-20 PROCEDURE — 80076 HEPATIC FUNCTION PANEL: CPT | Performed by: NURSE PRACTITIONER

## 2018-03-20 NOTE — MR AVS SNAPSHOT
[address], [place] [address] [address] [place] [address] 
[phone] Patient: Shubham Nichols MRN: [id number] Visit Information Date & Time Provider Department Dept. Phone Encounter #  
 3/20/2018 11:30 AM NARINDER Reynavaleri [address] of  [address] [id number] Follow-up Instructions Return in about 6 months (around 2018) for Eleazar. Upcoming Health Maintenance Date Due DTaP/Tdap/Td series (1 - Tdap) 1973 BREAST CANCER SCRN MAMMOGRAM 2002 FOBT Q 1 YEAR AGE 50-75 2002 ZOSTER VACCINE AGE 60> 2011 GLAUCOMA SCREENING Q2Y 2017 Bone Densitometry (Dexa) Screening 2017 Pneumococcal 65+ Low/Medium Risk (1 of 2 - PCV13) 2017 Influenza Age 5 to Adult 2017 MEDICARE YEARLY EXAM 3/14/2018 Allergies as of 3/20/2018  Review Complete On: 3/20/2018 By: Mila Lennon Severity Noted Reaction Type Reactions Codeine  2017    Other (comments) Statins-hmg-coa Reductase Inhibitors  2017    Other (comments) Sulfa (Sulfonamide Antibiotics)  2017    Rash Current Immunizations  Never Reviewed No immunizations on file. Not reviewed this visit You Were Diagnosed With   
  
 Codes Comments Elevated liver enzymes    -  Primary ICD-10-CM: R74.8 ICD-9-CM: 790.5 Vitals BP Pulse Temp Resp Height(growth percentile) 116/73 (BP 1 Location: Right arm, BP Patient Position: Sitting) 77 98.5 °F (36.9 °C) (Tympanic) 18 5' (1.524 m) Weight(growth percentile) SpO2 BMI OB Status Smoking Status 122 lb (55.3 kg) 97% 23.83 kg/m2 Postmenopausal Never Smoker BMI and BSA Data Body Mass Index Body Surface Area  
 23.83 kg/m 2 1.53 m 2 Preferred Pharmacy Pharmacy Name Phone [id number] [employer] [facility] [place], [address] [facility] [address] AT [address] [phone] Your Updated Medication List  
  
   
This list is accurate as of 3/20/18 12:15 PM.  Always use your most recent med list.  
  
  
  
  
 fenofibrate 160 mg tablet Commonly known as:  LOFIBRA Take 160 mg by mouth daily. multivitamin, tx-iron-ca-min 9 mg iron-400 mcg Tab tablet Commonly known as:  THERA-M w/ IRON Take 1 Tab by mouth daily. PROLIA 60 mg/mL injection Generic drug:  denosumab 60 mg by SubCUTAneous route. VITAMIN D3 1,000 unit Cap Generic drug:  cholecalciferol Take  by mouth daily. Follow-up Instructions Return in about 6 months (around 9/20/2018) for Eleazar. To-Do List   
 03/20/2018 Lab:  CBC WITH AUTOMATED DIFF   
  
 03/20/2018 Lab:  FERRITIN   
  
 03/20/2018 Lab:  HEPATIC FUNCTION PANEL   
  
 03/20/2018 Lab:  IRON PROFILE   
  
 03/20/2018 Lab:  METABOLIC PANEL, BASIC Introducing [facility]! Dear Nira Runner: Thank you for requesting a [employer] account. Our records indicate that you already have an active [employer] account. You can access your account anytime at [email or web address] Did you know that you can access your hospital and ER discharge instructions at any time in [employer]? You can also review all of your test results from your hospital stay or ER visit. Additional Information If you have questions, please visit the Frequently Asked Questions section of the [employer] website at [email or web address] Remember, [employer] is NOT to be used for urgent needs. For medical emergencies, dial 911. Now available from your iPhone and Android! Please provide this summary of care documentation to your next provider. Your primary care clinician is listed as Joy Garsia. If you have any questions after today's visit, please call [phone].

## 2018-03-20 NOTE — PROGRESS NOTES
[address] Kelle Blackwell MD, [address], [facility] [place]       ANRINDER Aceves PA-C Gearl Clamp, NARINDER Patino NP        at [address], [address] [place]     [address] [facility], [place]  [address].     [phone]     FAX: [phone]    at [address], [address]     [phone]     FAX: [phone]       Patient Care Team:  Tiana Alfred MD as PCP - General (Family Practice)  Regino Reynolds MD (Gastroenterology)  Marcial Sparks MD (Internal Medicine)      Problem List  Date Reviewed: 3/20/2018          Codes Class Noted    Osteopenia ICD-10-CM: M85.80  ICD-9-CM: 733.90  8/21/2017        Elevated liver enzymes ICD-10-CM: R74.8  ICD-9-CM: 790.5  8/21/2017        Age-related osteoporosis without current pathological fracture ICD-10-CM: M81.0  ICD-9-CM: 733.01  8/21/2017        Anemia ICD-10-CM: D64.9  ICD-9-CM: 285.9  8/21/2017              Joaquín Hines returns to the [address] for management of elevated liver enzymes and elevated ferritin level. The active problem list, all pertinent past medical history, medications, radiologic findings and laboratory findings related to the liver disorder were reviewed with the patient. The patient is a 79 y.o. Black female who was first noted to have abnormalities in liver enzymes and ferritin in 5/2017. Serologic evaluation for markers of chronic liver disease were positive for elevation in ferritin. The most recent imaging of the liver was Ultrasound performed in 9/2017. Results suggest chronic liver disease. Assessment of liver fibrosis was performed with sheer wave elastogrphy at [facility] in 9/2017. The result was 3.7 kPa which correlates with no fibrosis.     The most recent laboratory studies indicate that the liver transaminases are now normal, alkaline phosphatase is normal, tests of hepatic synthetic and metabolic function are normal, and the platelet count is normal.      The patient has no symptoms which could be attributed to the liver disorder. The patient completes all daily activities without any functional limitations. The patient has not experienced fatigue, pain in the right side over the liver. ALLERGIES  Allergies   Allergen Reactions    Codeine Other (comments)    Statins-Hmg-Coa Reductase Inhibitors Other (comments)    Sulfa (Sulfonamide Antibiotics) Rash       MEDICATIONS  Current Outpatient Prescriptions   Medication Sig    fenofibrate (LOFIBRA) 160 mg tablet Take 160 mg by mouth daily.  cholecalciferol (VITAMIN D3) 1,000 unit cap Take  by mouth daily.  multivitamin, tx-iron-ca-min (THERA-M W/ IRON) 9 mg iron-400 mcg tab tablet Take 1 Tab by mouth daily.  denosumab (PROLIA) 60 mg/mL injection 60 mg by SubCUTAneous route. No current facility-administered medications for this visit. SYSTEM REVIEW NOT RELATED TO LIVER DISEASE OR REVIEWED ABOVE:  Constitution systems: Negative for fever, chills, weight gain, weight loss. Eyes: Negative for visual changes. ENT: Negative for sore throat, painful swallowing. Respiratory: Negative for cough, hemoptysis, SOB. Cardiology: Negative for chest pain, palpitations. GI:  Negative for constipation or diarrhea. : Negative for urinary frequency, dysuria, hematuria, nocturia. Skin: Negative for rash. Hematology: Negative for easy bruising, blood clots. Musculo-skelatal: Negative for back pain, muscle pain, weakness. Neurologic: Negative for headaches, dizziness, vertigo, memory problems not related to HE. Psychology: Negative for anxiety, depression. FAMILY HISTORY:  The father  of unknown causes. The mother is alive and has the following chronic diseases: CAD, COPD, DM. There is no family history of liver disease. SOCIAL HISTORY:  The patient is .     The patient has 2 children, 1 grandchild. The patient has never used tobacco products. The patient consumes alcohol on social occasions never in excess. The patient used to work in a medical office. The patient retired in 2011. PHYSICAL EXAMINATION:  Visit Vitals    /73 (BP 1 Location: Right arm, BP Patient Position: Sitting)    Pulse 77    Temp 98.5 °F (36.9 °C) (Tympanic)    Resp 18    Ht 5' (1.524 m)    Wt 122 lb (55.3 kg)    SpO2 97%    BMI 23.83 kg/m2     General: No acute distress. Eyes: Sclera anicteric. ENT: No oral lesions. Thyroid normal.  Nodes: No adenopathy. Skin: No spider angiomata. No jaundice. No palmar erythema. Respiratory: Lungs clear to auscultation. Cardiovascular: Regular heart rate. No murmurs. No JVD. Abdomen: Soft non-tender. Liver size normal to percussion/palpation. Spleen not palpable. No obvious ascites. Extremities: No edema. No muscle wasting. No gross arthritic changes. Neurologic: Alert and oriented. Cranial nerves grossly intact. No asterixis.     LABORATORY STUDIES:  Liver [place] of [address] [place] [address] & Units 9/18/2017 8/21/2017   WBC 4.6 - 13.2 K/uL  4.3 (L)   ANC 1.8 - 8.0 K/UL  1.7 (L)   HGB 12.0 - 16.0 g/dL  10.6 (L)    - 420 K/uL  385   AST 15 - 37 U/L 30 27   ALT 13 - 56 U/L 28 29   Alk Phos 45 - 117 U/L 60 59   Bili, Total 0.2 - 1.0 MG/DL 0.2 0.4   Bili, Direct 0.0 - 0.2 MG/DL 0.1 0.1   Albumin 3.4 - 5.0 g/dL 4.3 4.6   BUN 7.0 - 18 MG/DL  18   Creat 0.6 - 1.3 MG/DL  0.79   Na 136 - 145 mmol/L  140   K 3.5 - 5.5 mmol/L  4.3   Cl 100 - 108 mmol/L  103   CO2 21 - 32 mmol/L  28   Glucose 74 - 99 mg/dL  87     SEROLOGIES:  Serologies Latest Ref Rng & Units 8/21/2017   Hep A Ab, Total NEGATIVE   NEGATIVE   Hep B Surface Ag <1.00 Index <0.10   Hep B Surface Ag Interp NEG   NEGATIVE   Hep B Core Ab, Total NEGATIVE   NEGATIVE   Hep B Surface Ab >10.0 mIU/mL <3.10 (L)   Hep B Surface Ab Interp POS   NEGATIVE (A)   Hep C Ab 0.0 - 0.9 s/co ratio 0.1   Ferritin 8 - 388 NG/ (H)   Iron % Saturation % 21   DORY, IFA  Positive (A)   DORY Pattern  1:80   ASMCA 0 - 19 Units 11     LIVER HISTOLOGY:  9/2017. Sheer wave elastography performed at [facility]. E Range: 3.03-4.86 kPa, E Mean: 3.83 kPa, E Median: 3.71 kPa, E Std: 0.67 kPa. The results suggested a fibrosis level of F0. ENDOSCOPIC PROCEDURES:  Not available or performed    RADIOLOGY:  9/2017. Ultrasound of liver. Mild heterogeneous echotexture. No liver mass lesions. OTHER TESTING:  Not available or performed    ASSESSMENT AND PLAN:  Transient elevation in AST which is now in the normal range. Have repeated liver enzymes again today. There is an elevation in ferritin with normal iron saturation. HFE genetic testing was negative. The elevation in ferritin may reflect mild steatosis or inflammation and correlates with the increased echogenicity seen on the ultrasound. The positive DORY suggests that there the may be an underlying autoimmune liver disease. However, given that the liver enzymes have returned to normal this is unlikely. Will need to monitor the liver chemistries to see if the liver enzymes continue to fluctuate or become persistently elevated. The elastography is a normal value and suggests there is no liver injury. Elastography can be repeated annually to assess for fibrosis progression and need for treatment of the liver disorder. The patient was directed to continue all current medications at the current dosages. There are no contraindications for the patient to take any medications that are necessary for treatment of other medical issues. The patient was counseled regarding alcohol consumption. Vaccination for viral hepatitis A and B is recommended since the patient has no serologic evidence of previous exposure or vaccination with immunity. All of the above issues were discussed with the patient. All questions were answered. The patient expressed a clear understanding of the above. [address] [place] [address] in 6 months to review all data and determine the treatment plan.     NARINDER Long 13 of [facility]    [facility], [address]  Roberta Salinas, [address]  [phone]

## 2018-03-20 NOTE — PROGRESS NOTES
Nella Castellano is a 79 y.o. female    No chief complaint on file. 1. Have you been to the ER, urgent care clinic or hospitalized since your last visit? NO.     2. Have you seen or consulted any other health care providers outside of the [address] [place] [address] since your last visit (Include any pap smears or colon screening)? YES    Patient went to to have Prolea infusion.     Learning Assessment 8/21/2017   PRIMARY LEARNER Patient   PRIMARY LANGUAGE ENGLISH   LEARNER PREFERENCE PRIMARY DEMONSTRATION   ANSWERED BY self   RELATIONSHIP SELF

## 2018-03-21 NOTE — PROGRESS NOTES
Ferritin down to 528. Iron saturation normal. Liver function and enzymes WNL.  Follow up as scheduled

## 2018-09-20 ENCOUNTER — OFFICE VISIT (OUTPATIENT)
Dept: HEMATOLOGY | Age: 68
End: 2018-09-20

## 2018-09-20 VITALS
RESPIRATION RATE: 14 BRPM | DIASTOLIC BLOOD PRESSURE: 73 MMHG | HEART RATE: 76 BPM | HEIGHT: 60 IN | SYSTOLIC BLOOD PRESSURE: 114 MMHG | WEIGHT: 121.2 LBS | OXYGEN SATURATION: 98 % | BODY MASS INDEX: 23.8 KG/M2 | TEMPERATURE: 96.8 F

## 2018-09-20 DIAGNOSIS — R74.8 ELEVATED LIVER ENZYMES: Primary | ICD-10-CM

## 2018-09-20 NOTE — PROGRESS NOTES
[address] Kelle Blackwell MD, [address], [facility], [place]       Mari Hooper, CELENA Aviles NP Patra Bays, NP        at [address], [address] [address] [place]  [address].     [phone]     FAX: [phone]    at [facility], [address] [place] [address], [address]     [phone]     FAX: [phone]       Patient Care Team:  Isac Alegria MD as PCP - General (Family Practice)  Megan Bey MD (Gastroenterology)  Katey Draper MD (Internal Medicine)      Problem List  Date Reviewed: 3/20/2018          Codes Class Noted    Osteopenia ICD-10-CM: M85.80  ICD-9-CM: 733.90  8/21/2017        Elevated liver enzymes ICD-10-CM: R74.8  ICD-9-CM: 790.5  8/21/2017        Age-related osteoporosis without current pathological fracture ICD-10-CM: M81.0  ICD-9-CM: 733.01  8/21/2017        Anemia ICD-10-CM: D64.9  ICD-9-CM: 285.9  8/21/2017              [facility]el[facility] [place] [facility] for management of elevated liver enzymes and elevated ferritin level. The active problem list, all pertinent past medical history, medications, radiologic findings and laboratory findings related to the liver disorder were reviewed with the patient. The patient is a 79 y.o. Black female who was first noted to have abnormalities in liver enzymes and ferritin in 5/2017. Serologic evaluation for markers of chronic liver disease were positive for elevation in ferritin. The most recent imaging of the liver was Ultrasound performed in 9/2017. Results demonstrated mildly heterogeneous echotexture. Assessment of liver fibrosis was performed with sheer wave elastogrphy at [facility] in 9/2017. The result was 3.7 kPa which correlates with no fibrosis.     The most recent laboratory studies indicate that the liver transaminases are normal, alkaline phosphatase is normal, tests of hepatic synthetic and metabolic function are normal, and the platelet count is normal.      The patient has no symptoms which could be attributed to the liver disorder. The patient has not experienced fatigue, pain in the right side over the liver. The patient completes all daily activities without any functional limitations. ALLERGIES  Allergies   Allergen Reactions    Codeine Other (comments)    Statins-Hmg-Coa Reductase Inhibitors Other (comments)    Sulfa (Sulfonamide Antibiotics) Rash       MEDICATIONS  Current Outpatient Prescriptions   Medication Sig    fenofibrate (LOFIBRA) 160 mg tablet Take 160 mg by mouth daily.  cholecalciferol (VITAMIN D3) 1,000 unit cap Take  by mouth daily.  multivitamin, tx-iron-ca-min (THERA-M W/ IRON) 9 mg iron-400 mcg tab tablet Take 1 Tab by mouth daily.  denosumab (PROLIA) 60 mg/mL injection 60 mg by SubCUTAneous route. No current facility-administered medications for this visit. SYSTEM REVIEW NOT RELATED TO LIVER DISEASE OR REVIEWED ABOVE:  Constitution systems: Negative for fever, chills, weight gain, weight loss. Eyes: Negative for visual changes. ENT: Negative for sore throat, painful swallowing. Respiratory: Negative for cough, hemoptysis, SOB. Cardiology: Negative for chest pain, palpitations. GI:  Negative for constipation or diarrhea. : Negative for urinary frequency, dysuria, hematuria, nocturia. Skin: Negative for rash. Hematology: Negative for easy bruising, blood clots. Musculo-skelatal: Negative for back pain, muscle pain, weakness. Neurologic: Negative for headaches, dizziness, vertigo, memory problems not related to HE. Psychology: Negative for anxiety, depression. FAMILY HISTORY:  The father  of unknown causes. The mother is alive and has the following chronic diseases: CAD, COPD, DM. There is no family history of liver disease. SOCIAL HISTORY:  The patient is . The patient has 2 children, 1 grandchild. The patient has never used tobacco products. The patient consumes alcohol on social occasions never in excess. The patient used to work in a medical office. The patient retired in 2011. PHYSICAL EXAMINATION:  Visit Vitals    /73 (BP 1 Location: Right arm, BP Patient Position: Sitting)    Pulse 76    Temp 96.8 °F (36 °C) (Tympanic)    Resp 14    Ht 5' (1.524 m)    Wt 121 lb 3.2 oz (55 kg)    SpO2 98%    BMI 23.67 kg/m2     General: No acute distress. Eyes: Sclera anicteric. ENT: No oral lesions. Thyroid normal.  Nodes: No adenopathy. Skin: No spider angiomata. No jaundice. No palmar erythema. Respiratory: Lungs clear to auscultation. Cardiovascular: Regular heart rate. No murmurs. No JVD. Abdomen: Soft non-tender. Liver size normal to percussion/palpation. Spleen not palpable. No obvious ascites. Extremities: No edema. No muscle wasting. No gross arthritic changes. Neurologic: Alert and oriented. Cranial nerves grossly intact. No asterixis.     LABORATORY STUDIES:  From 5/2018  AST/ALT/ALP/T Bili/ALB: 28/14/26/0.4/4.9  WBC/HB/PLT 5.6/10.7/345/  BUN/CREAT:21/0.8    Liver [place] of [address] [place] [address] Ref Rng & Units 3/20/2018 9/18/2017   WBC 4.6 - 13.2 K/uL 4.1 (L)    ANC 1.8 - 8.0 K/UL 1.5 (L)    HGB 12.0 - 16.0 g/dL 10.0 (L)     - 420 K/uL 338    AST 15 - 37 U/L 25 30   ALT 13 - 56 U/L 25 28   Alk Phos 45 - 117 U/L 35 (L) 60   Bili, Total 0.2 - 1.0 MG/DL 0.3 0.2   Bili, Direct 0.0 - 0.2 MG/DL 0.1 0.1   Albumin 3.4 - 5.0 g/dL 4.1 4.3   BUN 7.0 - 18 MG/DL 17    Creat 0.6 - 1.3 MG/DL 0.68    Na 136 - 145 mmol/L 146 (H)    K 3.5 - 5.5 mmol/L 4.1    Cl 100 - 108 mmol/L 108    CO2 21 - 32 mmol/L 27    Glucose 74 - 99 mg/dL 84      SEROLOGIES:  Serologies Latest Ref Rng & Units 8/21/2017   Hep A Ab, Total NEGATIVE   NEGATIVE   Hep B Surface Ag <1.00 Index <0.10   Hep B Surface Ag Interp NEG   NEGATIVE   Hep B Core Ab, Total NEGATIVE   NEGATIVE   Hep B Surface Ab >10.0 mIU/mL <3.10 (L)   Hep B Surface Ab Interp POS   NEGATIVE (A)   Hep C Ab 0.0 - 0.9 s/co ratio 0.1   Ferritin 8 - 388 NG/ (H)   Iron % Saturation % 21   DORY, IFA  Positive (A)   DORY Pattern  1:80   ASMCA 0 - 19 Units 11     LIVER HISTOLOGY:  9/2017. Sheer wave elastography performed at [facility]. E Range: 3.03-4.86 kPa, E Mean: 3.83 kPa, E Median: 3.71 kPa, E Std: 0.67 kPa. The results suggested a fibrosis level of F0. ENDOSCOPIC PROCEDURES:  Not available or performed    RADIOLOGY:  9/2017. Ultrasound of liver. Mild heterogeneous echotexture. No liver mass lesions. OTHER TESTING:  Not available or performed    ASSESSMENT AND PLAN:  Transient elevation in AST which is now in the normal range. Have repeated liver enzymes again today. There is an elevation in ferritin with normal iron saturation. HFE genetic testing was negative. The elevation in ferritin may reflect mild steatosis or inflammation and correlates with the increased echogenicity seen on the ultrasound. The positive DORY suggests that there the may be an underlying autoimmune liver disease. However, given that the liver enzymes have returned to normal this is unlikely. Will need to monitor the liver chemistries to see if the liver enzymes continue to fluctuate or become persistently elevated. The elastography is a normal value and suggests there is no liver injury. Elastography can be repeated annually to assess for fibrosis progression and need for treatment of the liver disorder. This has been ordered. The patient was directed to continue all current medications at the current dosages. There are no contraindications for the patient to take any medications that are necessary for treatment of other medical issues. The patient was counseled regarding alcohol consumption.     Vaccination for viral hepatitis A and B is recommended since the patient has no serologic evidence of previous exposure or vaccination with immunity. All of the above issues were discussed with the patient. All questions were answered. The patient expressed a clear understanding of the above. No follow-up at [place] [address] is needed as long as the elastography remains within normal limits. I would be glad to see the patient back for follow-up at any time in the future if the clinical situation changes.         Tianna Carrillo AGPCNP-BC  [address] [place] [address] [address]  [facility], [address] [place] [address] Street [address] [phone]

## 2018-10-09 ENCOUNTER — HOSPITAL ENCOUNTER (OUTPATIENT)
Dept: ULTRASOUND IMAGING | Age: 68
Discharge: HOME | End: 2018-10-09
Attending: NURSE PRACTITIONER
Payer: MEDICARE

## 2018-10-09 DIAGNOSIS — R74.8 ELEVATED LIVER ENZYMES: ICD-10-CM

## 2018-10-09 PROCEDURE — 0346T US ABD LTD W ELASTOGRAPHY: CPT
